# Patient Record
Sex: FEMALE | Race: BLACK OR AFRICAN AMERICAN | Employment: UNEMPLOYED | URBAN - METROPOLITAN AREA
[De-identification: names, ages, dates, MRNs, and addresses within clinical notes are randomized per-mention and may not be internally consistent; named-entity substitution may affect disease eponyms.]

---

## 2018-06-21 ENCOUNTER — HOSPITAL ENCOUNTER (EMERGENCY)
Facility: CLINIC | Age: 48
Discharge: HOME | End: 2018-06-21
Attending: EMERGENCY MEDICINE | Admitting: EMERGENCY MEDICINE
Payer: COMMERCIAL

## 2018-06-21 VITALS
DIASTOLIC BLOOD PRESSURE: 85 MMHG | SYSTOLIC BLOOD PRESSURE: 139 MMHG | HEIGHT: 64 IN | TEMPERATURE: 100.1 F | WEIGHT: 214.8 LBS | BODY MASS INDEX: 36.67 KG/M2 | RESPIRATION RATE: 16 BRPM | OXYGEN SATURATION: 100 % | HEART RATE: 70 BPM

## 2018-06-21 DIAGNOSIS — J06.9 VIRAL URI: ICD-10-CM

## 2018-06-21 DIAGNOSIS — M54.2 NECK PAIN: ICD-10-CM

## 2018-06-21 PROCEDURE — 99282 EMERGENCY DEPT VISIT SF MDM: CPT

## 2018-06-21 RX ORDER — METHOCARBAMOL 750 MG/1
750-1500 TABLET, FILM COATED ORAL 3 TIMES DAILY PRN
Qty: 30 TABLET | Refills: 0 | Status: SHIPPED | OUTPATIENT
Start: 2018-06-21 | End: 2018-06-26

## 2018-06-21 NOTE — ED AVS SNAPSHOT
Emergency Department    [address] [facility] [address]    Phone:  [phone]    Fax:  [phone]                                       Neema Kaur   MRN: [id number]    Department:   Emergency Department   Date of Visit:  6/21/2018           After Visit Summary Signature Page     I have received my discharge instructions, and my questions have been answered. I have discussed any challenges I see with this plan with the nurse or doctor.    ..........................................................................................................................................  Patient/Patient Representative Signature      ..........................................................................................................................................  Patient Representative Print Name and Relationship to Patient    ..................................................               ................................................  Date                                            Time    ..........................................................................................................................................  Reviewed by Signature/Title    ...................................................              ..............................................  Date                                                            Time

## 2018-06-21 NOTE — ED PROVIDER NOTES
"  History     Chief Complaint:  Neck pain    HPI   Neema Kaur is a 47 year old female who presents due to neck pain. She reportedly woke up with watery eyes, runny nose, sore throat, and sinus congestion 3 days ago. She also had bilateral neck pain, with difficulty moving from side to side. She saw a massage therapist today, and she noted that this made the pain worse. The patient denies having a fever or cough. She has tried taking Tylenol and Ibuprofen with minimal relief to her symptoms.    Allergies:  No Known Drug Allergies     Medications:    Synthroid    Past Medical History:    Arthritis  Hypertension  Thyroid disease    Past Surgical History:    History reviewed. No pertinent past surgical history.    Family History:    The patient denies any relevant family medical history.    Social History:  The patient was accompanied to the ED by friend.   Marital Status: Single.   No alcohol or other drug use.   No smoking history.      Review of Systems   HENT: Positive for rhinorrhea, sinus pressure and sore throat.    Musculoskeletal: Positive for myalgias and neck pain.   All other systems reviewed and are negative.    Physical Exam   Vitals:  Patient Vitals for the past 24 hrs:   BP Temp Temp src Heart Rate Resp SpO2 Height Weight   06/21/18 1820 (!) 192/92 100.1  F (37.8  C) Oral 83 16 99 % 1.626 m (5' 4\") 97.4 kg (214 lb 12.8 oz)     Physical Exam  General: Alert and interactive. Appears well. Cooperative and pleasant.   Eyes: The pupils are equal and round. EOMs intact. No scleral icterus.  ENT: No abnormalities to the external nose or ears. Mucous membranes moist. Posterior oropharynx is non-erythematous. Some tenderness with palpation of the sinuses bilaterally.    Neck: Trachea is in the midline. No nuchal rigidity. Minimal range of motion due to pain.    CV: Regular rate and rhythm. S1 and S2 normal without murmur, click, gallop or rub.   Resp: Breath sounds are clear bilaterally, without rhonchi, " wheezes, rales. Non-labored, no retractions or accessory muscle use.     GI: Abdomen is soft without distension. No tenderness to palpation. No peritoneal signs.    MS: Moving all extremities well. Good muscle tone.   Skin: Warm and dry. No rash or lesions noted.  Neuro: Alert and oriented x 3. No focal neurologic deficits. Good strength and sensation in upper and lower extremities.    Psych: Awake. Alert.  Normal affect. Appropriate interactions.  Lymph: Tenderness in neck but no anterior or posterior cervical lymphadenopathy noted.    Emergency Department Course   Emergency Department Course:  Nursing notes and vitals reviewed.  I performed an exam of the patient as documented above.   I discussed the treatment plan with the patient. They expressed understanding of this plan and consented to discharge. They will be discharged home with instructions for care and follow up. In addition, the patient will return to the emergency department with any new or worsening symptoms.  All questions were answered.    Impression & Plan    Medical Decision Making: Neema Kaur is a 47 year old female who presents for evaluation of nasal congestion, and a sore throat. This is consistent with a viral upper respiratory tract infection. There are no signs at this point of bacterial infection such as otitis media, retropharyngeal abscess, epiglottitis, peritonsillar abscess, strep pharyngitis, pneumonia, meningitis, or other more serious signs of bacteremia. The patient is well hydrated and otherwise well-appearing. Vitals are stable. Given clear lungs, no fever, no hypoxia and no respiratory distress, I do not feel that the patient needs a CXR at this point as the probability of bacterial pneumonia is very unlikely. Given only three days of symptoms, bacterial sinusitis is unlikely and I will not treat her with antibiotics.     Additionally, the patient complained of neck pain, and has some stiffness on exam. I do think this  is musculoskeletal in nature, as it resulted after sleeping poorly on a pillow one night. No trauma. No meningismus to suggest meningitis. I have given the patient a prescription for Robaxin to use for muscle relaxant. Close followup with primary care physician is recommended and precautions are given to return to ED for fever > 103, respiratory distress, protracted vomiting, confusion, or any other worsening symptoms. She has no further questions.     Diagnosis:    ICD-10-CM    1. Viral URI J06.9     B97.89    2. Neck pain M54.2      Disposition:   Discharged to home.     Discharge Medications:  New Prescriptions    METHOCARBAMOL (ROBAXIN) 750 MG TABLET    Take 1-2 tablets (750-1,500 mg) by mouth 3 times daily as needed for muscle spasms     IBeba PA-C, interviewed the patient, explained the course of action and discussed the patient with Dr. Baker, who then evaluated the patient.    Scribe Disclosure:  I, Camilo Higginbotham, am serving as a scribe at 6:56 PM on 6/21/2018 to document services personally performed by Beba Ballard PA-C, based on my observations and the provider's statements to me.  6/21/2018    EMERGENCY DEPARTMENT       Beba Ballard PA-C  06/22/18 0005

## 2018-06-21 NOTE — ED AVS SNAPSHOT
Emergency Department    [address] [facility] [address]    Phone:  [phone]    Fax:  [phone]                                       Neema Kaur   MRN: [id number]    Department:   Emergency Department   Date of Visit:  6/21/2018           Patient Information     Date Of Birth          1970        Your diagnoses for this visit were:     Viral URI     Neck pain        You were seen by Petros Baker MD.      Follow-up Information     Follow up with Kalpana Zapata MD In 1 week.    Specialty:  Family Practice    Contact information:    [facility]  [address]  [facility] [place] [address] [phone]          Follow up with  Emergency Department.    Specialty:  EMERGENCY MEDICINE    Why:  If symptoms worsen    Contact information:    [address] [facility] [address] [phone]        Discharge Instructions       Discharge Instructions  Upper Respiratory Infection    The upper respiratory tract includes the sinuses, nasal passages, pharynx, and larynx. A URI, or upper respiratory infection, is an infection of any of the parts of the upper airway. Symptoms include runny nose, congestion, sneezing, sore throat, cough, and fever. URIs are almost always caused by a virus. Antibiotics do not help with viral infections, so are generally not prescribed. A URI is very contagious through coughing and nasal secretions; make sure you wash your hands often and clean surfaces after sneezing, coughing or touching them. While you should start to improve in 3 - 5 days, remember that sometimes a cough can linger for several weeks.    Generally, every Emergency Department visit should have a follow-up clinic visit with either a primary or a specialty clinic/provider. Please follow-up as instructed by your emergency provider today.    Return to the Emergency Department if:    Any of your symptoms get much worse.    You seem very sick, like being too weak to get  up.    You have chest pain or shortness of breath.     You have a severe headache.    You are vomiting (throwing up) so much you cannot keep fluids or medicines down.    You have confusion or seem unusually drowsy.    You have a seizure.    What can I do to help myself?    Fill any prescriptions the provider gave you and take them right away    If you have a fever, get plenty of rest and drink lots of fluids, especially water.    Using a humidifier or saline nose spray will also help loosen mucous.     Clothes or blankets will not change your fever. Do what is comfortable for you.    Bathing or sponging in lukewarm water may help you feel better.    Acetaminophen (Tylenol ) or ibuprofen (Advil , Motrin ) will help bring fever down and may help you feel more comfortable. Be sure to read and follow the package directions, and ask your provider if you have questions.    Do not drink alcohol.    Decongestants may help you feel better. You may use decongestant nose sprays Afrin  (oxymetazoline) or Nikita-Synephrine  (phenylephrine hydrochloride) for up to 3 days, or may use a decongestant tablet like Sudafed  (pseudoephedrine).  If you were given a prescription for medicine here today, be sure to read all of the information (including the package insert) that comes with your prescription.  This will include important information about the medicine, its side effects, and any warnings that you need to know about.  The pharmacist who fills the prescription can provide more information and answer questions you may have about the medicine.  If you have questions or concerns that the pharmacist cannot address, please call or return to the Emergency Department.   Remember that you can always come back to the Emergency Department if you are not able to see your regular provider in the amount of time listed above, if you get any new symptoms, or if there is anything that worries you.  Discharge Instructions  Neck Strain    You have  been seen today for a neck sprain or strain.  Neck strains usually result from an injury to the neck. Car accidents, contact sports, and falls are common causes of neck strain. Sometimes your neck can start to hurt because of increased activity, muscle tension, an abnormal sleeping position, or because of other problems like arthritis in the neck.     Neck pain usually comes from injured muscles and ligaments. Sometimes there is a herniated ( slipped ) disc. We do not usually do MRI scans to look for these right away, since most herniated discs will get better on their own with time. Today, we did not find any evidence that your neck pain was caused by a serious or dangerous condition. However, sometimes symptoms develop over time and cannot be found during an emergency visit, so it is very important that you follow up with your primary provider.    Generally, every Emergency Department visit should have a follow-up clinic visit with either a primary or a specialty clinic/provider. Please follow-up as instructed by your emergency provider today.    Return to the Emergency Department if:    You have increasing pain in your neck.    You develop difficulty swallowing or breathing.    You have numbness, weakness, or trouble moving your arms or legs.    You have severe dizziness and difficulty walking.    You are unable to control your bladder or bowels.    You develop severe headache or ringing in the ears.    What can I do to help myself at home?    If you had an injury, use cold for the first 1-2 days. Cold helps relieve pain and reduce inflammation.  Apply ice packs to the neck or areas of pain every 1-2 hours for 20 minutes at a time. Place a towel or cloth between your skin and the ice pack.    After the first 2 days, using heat can help with neck pain and stiffness. You may use a warm shower or bath, warm towels on the neck, or a heating pad. Do not sleep with a heating pad, as you can be burned.     Pain  medications - You may take a pain medication such as Tylenol  (acetaminophen), Advil  and Motrin  (ibuprofen), or Aleve  (naproxen).    It is usually best to rest the neck for 1-2 days after an injury, then start gentle stretching exercises.     It is helpful to place a small pillow under the nape of your neck to provide proper neutral positioning.     You should stay active and do your usual work as much as you can, unless this involves heavy physical labor. Ask your provider if you need work restrictions.  If you were given a prescription for medicine here today, be sure to read all of the information (including the package insert) that comes with your prescription.  This will include important information about the medicine, its side effects, and any warnings that you need to know about.  The pharmacist who fills the prescription can provide more information and answer questions you may have about the medicine.  If you have questions or concerns that the pharmacist cannot address, please call or return to the Emergency Department.   Remember that you can always come back to the Emergency Department if you are not able to see your regular provider in the amount of time listed above, if you get any new symptoms, or if there is anything that worries you.      24 Hour Appointment Hotline       To make an appointment at any [facility], call [phone] ([phone]). If you don't have a family doctor or clinic, we will help you find one. [facility] are conveniently located to serve the needs of you and your family.             Review of your medicines      START taking        Dose / Directions Last dose taken    methocarbamol 750 MG tablet   Commonly known as:  ROBAXIN   Dose:  750-1500 mg   Quantity:  30 tablet        Take 1-2 tablets (750-1,500 mg) by mouth 3 times daily as needed for muscle spasms   Refills:  0          Our records show that you are taking the medicines listed below. If these are  incorrect, please call your family doctor or clinic.        Dose / Directions Last dose taken    SYNTHROID PO        Refills:  0                Prescriptions were sent or printed at these locations (1 Prescription)                   Other Prescriptions                Printed at Department/Unit printer (1 of 1)         methocarbamol (ROBAXIN) 750 MG tablet                Orders Needing Specimen Collection     None      Pending Results     No orders found from 6/19/2018 to 6/22/2018.            Pending Culture Results     No orders found from 6/19/2018 to 6/22/2018.            Pending Results Instructions     If you had any lab results that were not finalized at the time of your Discharge, you can call the ED Lab Result RN at [phone]. You will be contacted by this team for any positive Lab results or changes in treatment. The nurses are available 7 days a week from 10A to 6:30P.  You can leave a message 24 hours per day and they will return your call.        Test Results From Your Hospital Stay               Clinical Quality Measure: Blood Pressure Screening     Your blood pressure was checked while you were in the emergency department today. The last reading we obtained was  BP: (!) 192/92 . Please read the guidelines below about what these numbers mean and what you should do about them.  If your systolic blood pressure (the top number) is less than 120 and your diastolic blood pressure (the bottom number) is less than 80, then your blood pressure is normal. There is nothing more that you need to do about it.  If your systolic blood pressure (the top number) is 120-139 or your diastolic blood pressure (the bottom number) is 80-89, your blood pressure may be higher than it should be. You should have your blood pressure rechecked within a year by a primary care provider.  If your systolic blood pressure (the top number) is 140 or greater or your diastolic blood pressure (the bottom number) is 90 or greater, you  "may have high blood pressure. High blood pressure is treatable, but if left untreated over time it can put you at risk for heart attack, stroke, or kidney failure. You should have your blood pressure rechecked by a primary care provider within the next 4 weeks.  If your provider in the emergency department today gave you specific instructions to follow-up with your doctor or provider even sooner than that, you should follow that instruction and not wait for up to 4 weeks for your follow-up visit.        Thank you for choosing [facility]       Thank you for choosing [facility] for your care. Our goal is always to provide you with excellent care. Hearing back from our patients is one way we can continue to improve our services. Please take a few minutes to complete the written survey that you may receive in the mail after you visit with us. Thank you!        [facility] [employer] Information     [employer] lets you send messages to your doctor, view your test results, renew your prescriptions, schedule appointments and more. To sign up, go to [email or web address] . Click on \"Log in\" on the left side of the screen, which will take you to the Welcome page. Then click on \"Sign up Now\" on the right side of the page.     You will be asked to enter the access code listed below, as well as some personal information. Please follow the directions to create your username and password.     Your access code is: [email or web address]  Expires: 2018  7:37 PM     Your access code will  in 90 days. If you need help or a new code, please call your [facility] clinic or [phone].        Care EveryWhere ID     This is your Care EveryWhere ID. This could be used by other organizations to access your [facility] medical records  [id number]        Equal Access to Services     CHICO GARVIN : alba Cid qaybta kaalmada adeegyada, waxay idiin hayaan adeeg kharash la'aan ah. [facility] [phone].    ATENCIÓN: Si habla " español, tiene a lin disposición servicios gratuitos de asistencia lingüística. [place] al [phone].    We comply with applicable federal civil rights laws and [place] laws. We do not discriminate on the basis of race, color, national origin, age, disability, sex, sexual orientation, or gender identity.            After Visit Summary       This is your record. Keep this with you and show to your community pharmacist(s) and doctor(s) at your next visit.

## 2018-06-22 ASSESSMENT — ENCOUNTER SYMPTOMS
NECK PAIN: 1
MYALGIAS: 1
RHINORRHEA: 1
SINUS PRESSURE: 1
SORE THROAT: 1

## 2018-06-22 NOTE — ED NOTES
Emergency Department Attending Supervision Note  6/21/2018  7:30 PM      I evaluated this patient in conjunction with Beba Ballard PA-C      This is a 47 year old female who presents with 3 days of neck soreness and now has a cold, a mild sore throat, and some facial pain. This neck pain began after she woke up. She has tried Advil, Tylenol, and has had a massage with nothing seeming to help. She seems to be able to turn to the right okay, but not to the left. She is unaware of any fever, but her temperature is very low grade here at approximately 100.       On my exam, there was no pain directly over the posterior neck. There is some tenderness in the left sternocleidal muscle and the left paraspinal muscles. Her face is non tender, oropharynx is clear, and there is some mild submandibular gland swelling but no adenopathy. Neurologically she is fully intact. I do not think this represents meningitis or acute sub arachnoid hemorrhage. The cold symptoms do likely represent a cold and not a bacterial sinusitis. Patient will be treated conservatively with muscle relaxers, Ibuprofen, and cold compresses. Follow up with her PMD>    Impression:  1.wry neck  2. upper respiratory infection.    Pertos Baker MD Steinman, Randall Ira, MD  06/22/18 0030

## 2018-06-22 NOTE — DISCHARGE INSTRUCTIONS
Discharge Instructions  Upper Respiratory Infection    The upper respiratory tract includes the sinuses, nasal passages, pharynx, and larynx. A URI, or upper respiratory infection, is an infection of any of the parts of the upper airway. Symptoms include runny nose, congestion, sneezing, sore throat, cough, and fever. URIs are almost always caused by a virus. Antibiotics do not help with viral infections, so are generally not prescribed. A URI is very contagious through coughing and nasal secretions; make sure you wash your hands often and clean surfaces after sneezing, coughing or touching them. While you should start to improve in 3 - 5 days, remember that sometimes a cough can linger for several weeks.    Generally, every Emergency Department visit should have a follow-up clinic visit with either a primary or a specialty clinic/provider. Please follow-up as instructed by your emergency provider today.    Return to the Emergency Department if:    Any of your symptoms get much worse.    You seem very sick, like being too weak to get up.    You have chest pain or shortness of breath.     You have a severe headache.    You are vomiting (throwing up) so much you cannot keep fluids or medicines down.    You have confusion or seem unusually drowsy.    You have a seizure.    What can I do to help myself?    Fill any prescriptions the provider gave you and take them right away    If you have a fever, get plenty of rest and drink lots of fluids, especially water.    Using a humidifier or saline nose spray will also help loosen mucous.     Clothes or blankets will not change your fever. Do what is comfortable for you.    Bathing or sponging in lukewarm water may help you feel better.    Acetaminophen (Tylenol ) or ibuprofen (Advil , Motrin ) will help bring fever down and may help you feel more comfortable. Be sure to read and follow the package directions, and ask your provider if you have questions.    Do not drink  alcohol.    Decongestants may help you feel better. You may use decongestant nose sprays Afrin  (oxymetazoline) or Nikita-Synephrine  (phenylephrine hydrochloride) for up to 3 days, or may use a decongestant tablet like Sudafed  (pseudoephedrine).  If you were given a prescription for medicine here today, be sure to read all of the information (including the package insert) that comes with your prescription.  This will include important information about the medicine, its side effects, and any warnings that you need to know about.  The pharmacist who fills the prescription can provide more information and answer questions you may have about the medicine.  If you have questions or concerns that the pharmacist cannot address, please call or return to the Emergency Department.   Remember that you can always come back to the Emergency Department if you are not able to see your regular provider in the amount of time listed above, if you get any new symptoms, or if there is anything that worries you.  Discharge Instructions  Neck Strain    You have been seen today for a neck sprain or strain.  Neck strains usually result from an injury to the neck. Car accidents, contact sports, and falls are common causes of neck strain. Sometimes your neck can start to hurt because of increased activity, muscle tension, an abnormal sleeping position, or because of other problems like arthritis in the neck.     Neck pain usually comes from injured muscles and ligaments. Sometimes there is a herniated ( slipped ) disc. We do not usually do MRI scans to look for these right away, since most herniated discs will get better on their own with time. Today, we did not find any evidence that your neck pain was caused by a serious or dangerous condition. However, sometimes symptoms develop over time and cannot be found during an emergency visit, so it is very important that you follow up with your primary provider.    Generally, every Emergency  Department visit should have a follow-up clinic visit with either a primary or a specialty clinic/provider. Please follow-up as instructed by your emergency provider today.    Return to the Emergency Department if:    You have increasing pain in your neck.    You develop difficulty swallowing or breathing.    You have numbness, weakness, or trouble moving your arms or legs.    You have severe dizziness and difficulty walking.    You are unable to control your bladder or bowels.    You develop severe headache or ringing in the ears.    What can I do to help myself at home?    If you had an injury, use cold for the first 1-2 days. Cold helps relieve pain and reduce inflammation.  Apply ice packs to the neck or areas of pain every 1-2 hours for 20 minutes at a time. Place a towel or cloth between your skin and the ice pack.    After the first 2 days, using heat can help with neck pain and stiffness. You may use a warm shower or bath, warm towels on the neck, or a heating pad. Do not sleep with a heating pad, as you can be burned.     Pain medications - You may take a pain medication such as Tylenol  (acetaminophen), Advil  and Motrin  (ibuprofen), or Aleve  (naproxen).    It is usually best to rest the neck for 1-2 days after an injury, then start gentle stretching exercises.     It is helpful to place a small pillow under the nape of your neck to provide proper neutral positioning.     You should stay active and do your usual work as much as you can, unless this involves heavy physical labor. Ask your provider if you need work restrictions.  If you were given a prescription for medicine here today, be sure to read all of the information (including the package insert) that comes with your prescription.  This will include important information about the medicine, its side effects, and any warnings that you need to know about.  The pharmacist who fills the prescription can provide more information and answer questions  you may have about the medicine.  If you have questions or concerns that the pharmacist cannot address, please call or return to the Emergency Department.   Remember that you can always come back to the Emergency Department if you are not able to see your regular provider in the amount of time listed above, if you get any new symptoms, or if there is anything that worries you.

## 2019-05-07 ENCOUNTER — OFFICE VISIT (OUTPATIENT)
Dept: INTERNAL MEDICINE | Facility: CLINIC | Age: 49
End: 2019-05-07
Payer: COMMERCIAL

## 2019-05-07 VITALS
OXYGEN SATURATION: 98 % | WEIGHT: 211 LBS | RESPIRATION RATE: 19 BRPM | BODY MASS INDEX: 36.02 KG/M2 | HEART RATE: 95 BPM | DIASTOLIC BLOOD PRESSURE: 60 MMHG | SYSTOLIC BLOOD PRESSURE: 110 MMHG | HEIGHT: 64 IN | TEMPERATURE: 98.1 F

## 2019-05-07 DIAGNOSIS — Z00.00 ROUTINE HISTORY AND PHYSICAL EXAMINATION OF ADULT: Primary | ICD-10-CM

## 2019-05-07 DIAGNOSIS — H53.9 VISION CHANGES: ICD-10-CM

## 2019-05-07 DIAGNOSIS — E66.01 MORBID OBESITY (H): ICD-10-CM

## 2019-05-07 DIAGNOSIS — E03.9 HYPOTHYROIDISM, UNSPECIFIED TYPE: ICD-10-CM

## 2019-05-07 DIAGNOSIS — I10 ESSENTIAL HYPERTENSION: ICD-10-CM

## 2019-05-07 LAB — HGB BLD-MCNC: 12.5 G/DL (ref 11.7–15.7)

## 2019-05-07 PROCEDURE — 36415 COLL VENOUS BLD VENIPUNCTURE: CPT | Performed by: INTERNAL MEDICINE

## 2019-05-07 PROCEDURE — 80061 LIPID PANEL: CPT | Performed by: INTERNAL MEDICINE

## 2019-05-07 PROCEDURE — 80053 COMPREHEN METABOLIC PANEL: CPT | Performed by: INTERNAL MEDICINE

## 2019-05-07 PROCEDURE — 99213 OFFICE O/P EST LOW 20 MIN: CPT | Mod: 25 | Performed by: INTERNAL MEDICINE

## 2019-05-07 PROCEDURE — 85018 HEMOGLOBIN: CPT | Performed by: INTERNAL MEDICINE

## 2019-05-07 PROCEDURE — 84443 ASSAY THYROID STIM HORMONE: CPT | Performed by: INTERNAL MEDICINE

## 2019-05-07 PROCEDURE — 99386 PREV VISIT NEW AGE 40-64: CPT | Performed by: INTERNAL MEDICINE

## 2019-05-07 RX ORDER — CHOLECALCIFEROL (VITAMIN D3) 50 MCG
1 TABLET ORAL DAILY
Refills: 3 | COMMUNITY
Start: 2018-08-29

## 2019-05-07 RX ORDER — HYDROCHLOROTHIAZIDE 25 MG/1
25 TABLET ORAL
Refills: 1 | COMMUNITY
Start: 2019-02-17 | End: 2019-05-07

## 2019-05-07 RX ORDER — HYDROCHLOROTHIAZIDE 25 MG/1
25 TABLET ORAL DAILY
Qty: 90 TABLET | Refills: 1 | Status: SHIPPED | OUTPATIENT
Start: 2019-05-07 | End: 2020-04-13

## 2019-05-07 RX ORDER — OMEPRAZOLE 40 MG/1
40 CAPSULE, DELAYED RELEASE ORAL
Refills: 2 | COMMUNITY
Start: 2018-12-26 | End: 2020-04-10

## 2019-05-07 SDOH — HEALTH STABILITY: MENTAL HEALTH: HOW OFTEN DO YOU HAVE A DRINK CONTAINING ALCOHOL?: NEVER

## 2019-05-07 ASSESSMENT — ENCOUNTER SYMPTOMS
HEMATURIA: 0
CHILLS: 0
CONSTIPATION: 0
COUGH: 0
ABDOMINAL PAIN: 0
HEMATOCHEZIA: 0

## 2019-05-07 ASSESSMENT — MIFFLIN-ST. JEOR: SCORE: 1572.09

## 2019-05-07 NOTE — NURSING NOTE
"/60   Pulse 95   Temp 98.1  F (36.7  C) (Oral)   Resp 19   Ht 1.626 m (5' 4\")   Wt 95.7 kg (211 lb)   LMP 02/07/2019   SpO2 98%   BMI 36.22 kg/m    Patient in for annual female physical.  Sandrita Oneil CMA    "

## 2019-05-07 NOTE — PROGRESS NOTES
SUBJECTIVE:   CC: Neema Kaur is an 48 year old female who presents for preventative health visit.     Healthy Habits:     Getting at least 3 servings of Calcium per day:  Yes    Bi-annual eye exam:  NO    Dental care twice a year:  NO    Sleep apnea or symptoms of sleep apnea:  None    Diet:  Regular (no restrictions)    Frequency of exercise:  None    Taking medications regularly:  No    Barriers to taking medications:  None    Medication side effects:  Not applicable    PHQ-2 Total Score: 0    Additional concerns today:  No      Hypertension Follow-up      Outpatient blood pressures are not being checked.    Low Salt Diet: low salt    Hypothyroidism Follow-up      Since last visit, patient describes the following symptoms: Weight stable, no hair loss, no skin changes, no constipation, no loose stools    Patient is requesting referral to see an ophthalmologist c/o vision changes , blurry virion with near objects.    Patient  also has pessary for prolapse .      Today's PHQ-2 Score:   PHQ-2 ( 1999 Pf[employer]) 5/7/2019   Q1: Little interest or pleasure in doing things 0   Q2: Feeling down, depressed or hopeless 0   PHQ-2 Score 0   Q1: Little interest or pleasure in doing things Not at all   Q2: Feeling down, depressed or hopeless Not at all   PHQ-2 Score 0       Abuse: Current or Past(Physical, Sexual or Emotional)- No  Do you feel safe in your environment? Yes      Past Medical History:   Diagnosis Date     Arthritis      Hypertension      Thyroid disease        History reviewed. No pertinent surgical history.      Current Outpatient Medications   Medication Sig Dispense Refill     hydrochlorothiazide (HYDRODIURIL) 25 MG tablet Take 1 tablet (25 mg) by mouth daily 90 tablet 1     Levothyroxine Sodium (SYNTHROID PO) Take 50 mcg by mouth daily        omeprazole (PRILOSEC) 40 MG DR capsule 40 mg  2     vitamin D3 (CHOLECALCIFEROL) 2000 units tablet Take 1 tablet by mouth daily  3       History reviewed. No  "pertinent family history.         Social History     Tobacco Use     Smoking status: Never Smoker     Smokeless tobacco: Never Used   Substance Use Topics     Alcohol use: Never     Frequency: Never     If you drink alcohol do you typically have >3 drinks per day or >7 drinks per week? No    Alcohol Use 5/7/2019   Prescreen: >3 drinks/day or >7 drinks/week? No   No flowsheet data found.      Reviewed orders with patient. Reviewed health maintenance and updated orders accordingly - Yes      Reviewed and updated as needed this visit by clinical staff  Tobacco  Allergies  Meds  Med Hx  Surg Hx  Fam Hx  Soc Hx        Reviewed and updated as needed this visit by Provider            Review of Systems   Constitutional: Negative for chills.   HENT: Negative for congestion.    Respiratory: Negative for cough.    Cardiovascular: Negative for chest pain.   Gastrointestinal: Negative for abdominal pain, constipation and hematochezia.   Genitourinary: Negative for hematuria.     CONSTITUTIONAL: NEGATIVE for fever, chills, change in weight  INTEGUMENTARY/SKIN: NEGATIVE for worrisome rashes, moles or lesions  EYES: NEGATIVE for vision changes or irritation  ENT: NEGATIVE for ear, mouth and throat problems  RESP: NEGATIVE for significant cough or SOB  CV: NEGATIVE for chest pain, palpitations or peripheral edema  GI: NEGATIVE for nausea, abdominal pain, heartburn, or change in bowel habits   ; negative for frequency,urgency   MUSCULOSKELETAL: NEGATIVE for significant arthralgias or myalgia  NEURO: NEGATIVE for weakness, dizziness or paresthesias  PSYCHIATRIC: NEGATIVE for changes in mood or affect    OBJECTIVE:   /60   Pulse 95   Temp 98.1  F (36.7  C) (Oral)   Resp 19   Ht 1.626 m (5' 4\")   Wt 95.7 kg (211 lb)   LMP 02/07/2019   SpO2 98%   BMI 36.22 kg/m      Physical Exam  GENERAL: healthy, alert and no distress  EYES: Eyes grossly normal to inspection, PERRL and conjunctivae and sclerae normal  HENT: ear " canals and TM's normal, nose and mouth without ulcers or lesions  NECK: no adenopathy, no asymmetry, masses, or scars and thyroid normal to palpation  RESP: lungs clear to auscultation - no rales, rhonchi or wheezes  BREAST: normal without masses, tenderness or nipple discharge and no palpable axillary masses or adenopathy  CV: regular rate and rhythm, normal S1 S2, no S3 or S4, no murmur, click or rub, no peripheral edema and peripheral pulses strong  ABDOMEN: soft, nontender, no hepatosplenomegaly, no masses and bowel sounds normal   (female): normal female external genitalia, normal urethral meatus, pt has pessary , pap could not be done   MS: no gross musculoskeletal defects noted, no edema  NEURO: Normal strength and tone, mentation intact and speech normal  PSYCH: mentation appears normal, affect normal/bright      ASSESSMENT/PLAN:        (Z00.00) Routine history and physical examination of adult  (primary encounter diagnosis)  Plan: Hemoglobin, Comprehensive metabolic panel,         Lipid panel reflex to direct LDL Fasting, MA         Screening Digital Bilateral            (I10) Essential hypertension  Comment: BP stable  Plan: hydrochlorothiazide (HYDRODIURIL) 25 MG tablet refilled.explained clearly about the medication,insructions and side effects.Advised to follow low salt diet and exercise and f/u in 6 mths.      (E66.01) Morbid obesity (H)  Plan:  Discussed in detail about Diet,calorie intake,and importance of regular exercise,       (H53.9) Vision changes  Plan: OPHTHALMOLOGY ADULT REFERRAL            (E03.9) Hypothyroidism, unspecified type  Plan: on levoxyl 50 mcg daily , check TSH with free T4 reflex.pt was told I will contact her after results and proceed accordingly.            COUNSELING:   Reviewed preventive health counseling, as reflected in patient instructions       Regular exercise       Healthy diet/nutrition    BP Readings from Last 1 Encounters:   05/07/19 110/60     Estimated body mass  "index is 36.22 kg/m  as calculated from the following:    Height as of this encounter: 1.626 m (5' 4\").    Weight as of this encounter: 95.7 kg (211 lb).      Weight management plan: Discussed healthy diet and exercise guidelines     reports that she has never smoked. She has never used smokeless tobacco.      Counseling Resources:  ATP IV Guidelines  Pooled Cohorts Equation Calculator  FRAX Risk Assessment  ICSI Preventive Guidelines  Dietary Guidelines for Americans, 2010  USDA's MyPlate  ASA Prophylaxis  Lung CA Screening    Tyree Metcalf MD  [facility]  "

## 2019-05-07 NOTE — LETTER
May 10, 2019      Neema Verolulu Kaur  [address] [place] [address]        Dear ,    Your lab results came back within acceptable limits except for slightly high LDL cholesterol which is the bad cholesterol. Recommendations are to exercise regularly and follow a low fat low cholesterol diet. We will repeat these labs in 1 year.     Resulted Orders   Hemoglobin   Result Value Ref Range    Hemoglobin 12.5 11.7 - 15.7 g/dL   Comprehensive metabolic panel   Result Value Ref Range    Sodium 140 133 - 144 mmol/L    Potassium 4.0 3.4 - 5.3 mmol/L    Chloride 109 94 - 109 mmol/L    Carbon Dioxide 29 20 - 32 mmol/L    Anion Gap 2 (L) 3 - 14 mmol/L    Glucose 97 70 - 99 mg/dL      Comment:      Fasting specimen    Urea Nitrogen 12 7 - 30 mg/dL    Creatinine 0.52 0.52 - 1.04 mg/dL    GFR Estimate >90 >60 mL/min/[1.73_m2]      Comment:      Non  GFR Calc  Starting 12/18/2018, serum creatinine based estimated GFR (eGFR) will be   calculated using the Chronic Kidney Disease Epidemiology Collaboration   (CKD-EPI) equation.      GFR Estimate If Black >90 >60 mL/min/[1.73_m2]      Comment:       GFR Calc  Starting 12/18/2018, serum creatinine based estimated GFR (eGFR) will be   calculated using the Chronic Kidney Disease Epidemiology Collaboration   (CKD-EPI) equation.      Calcium 9.5 8.5 - 10.1 mg/dL    Bilirubin Total 0.4 0.2 - 1.3 mg/dL    Albumin 3.6 3.4 - 5.0 g/dL    Protein Total 7.8 6.8 - 8.8 g/dL    Alkaline Phosphatase 54 40 - 150 U/L    ALT 18 0 - 50 U/L    AST 14 0 - 45 U/L   Lipid panel reflex to direct LDL Fasting   Result Value Ref Range    Cholesterol 176 <200 mg/dL    Triglycerides 61 <150 mg/dL      Comment:      Fasting specimen    HDL Cholesterol 54 >49 mg/dL    LDL Cholesterol Calculated 110 (H) <100 mg/dL      Comment:      Above desirable:  100-129 mg/dl  Borderline High:  130-159 mg/dL  High:             160-189 mg/dL  Very high:       >189 mg/dl      Non  HDL Cholesterol 122 <130 mg/dL   TSH with free T4 reflex   Result Value Ref Range    TSH 0.94 0.40 - 4.00 mU/L       If you have any questions or concerns, please call the clinic at the number listed above.       Sincerely,        Tyree Metcalf MD

## 2019-05-08 LAB
ALBUMIN SERPL-MCNC: 3.6 G/DL (ref 3.4–5)
ALP SERPL-CCNC: 54 U/L (ref 40–150)
ALT SERPL W P-5'-P-CCNC: 18 U/L (ref 0–50)
ANION GAP SERPL CALCULATED.3IONS-SCNC: 2 MMOL/L (ref 3–14)
AST SERPL W P-5'-P-CCNC: 14 U/L (ref 0–45)
BILIRUB SERPL-MCNC: 0.4 MG/DL (ref 0.2–1.3)
BUN SERPL-MCNC: 12 MG/DL (ref 7–30)
CALCIUM SERPL-MCNC: 9.5 MG/DL (ref 8.5–10.1)
CHLORIDE SERPL-SCNC: 109 MMOL/L (ref 94–109)
CHOLEST SERPL-MCNC: 176 MG/DL
CO2 SERPL-SCNC: 29 MMOL/L (ref 20–32)
CREAT SERPL-MCNC: 0.52 MG/DL (ref 0.52–1.04)
GFR SERPL CREATININE-BSD FRML MDRD: >90 ML/MIN/{1.73_M2}
GLUCOSE SERPL-MCNC: 97 MG/DL (ref 70–99)
HDLC SERPL-MCNC: 54 MG/DL
LDLC SERPL CALC-MCNC: 110 MG/DL
NONHDLC SERPL-MCNC: 122 MG/DL
POTASSIUM SERPL-SCNC: 4 MMOL/L (ref 3.4–5.3)
PROT SERPL-MCNC: 7.8 G/DL (ref 6.8–8.8)
SODIUM SERPL-SCNC: 140 MMOL/L (ref 133–144)
TRIGL SERPL-MCNC: 61 MG/DL
TSH SERPL DL<=0.005 MIU/L-ACNC: 0.94 MU/L (ref 0.4–4)

## 2019-06-25 ENCOUNTER — OFFICE VISIT (OUTPATIENT)
Dept: INTERNAL MEDICINE | Facility: CLINIC | Age: 49
End: 2019-06-25
Payer: COMMERCIAL

## 2019-06-25 VITALS
HEART RATE: 89 BPM | DIASTOLIC BLOOD PRESSURE: 60 MMHG | OXYGEN SATURATION: 98 % | SYSTOLIC BLOOD PRESSURE: 126 MMHG | RESPIRATION RATE: 18 BRPM | TEMPERATURE: 97.5 F | WEIGHT: 207.6 LBS | HEIGHT: 64 IN | BODY MASS INDEX: 35.44 KG/M2

## 2019-06-25 DIAGNOSIS — N39.0 URINARY TRACT INFECTION WITH HEMATURIA, SITE UNSPECIFIED: ICD-10-CM

## 2019-06-25 DIAGNOSIS — R35.0 URINARY FREQUENCY: Primary | ICD-10-CM

## 2019-06-25 DIAGNOSIS — Z71.2 ENCOUNTER TO DISCUSS TEST RESULTS: ICD-10-CM

## 2019-06-25 DIAGNOSIS — R31.9 URINARY TRACT INFECTION WITH HEMATURIA, SITE UNSPECIFIED: ICD-10-CM

## 2019-06-25 LAB
ALBUMIN UR-MCNC: NEGATIVE MG/DL
APPEARANCE UR: ABNORMAL
BACTERIA #/AREA URNS HPF: ABNORMAL /HPF
BILIRUB UR QL STRIP: NEGATIVE
COLOR UR AUTO: YELLOW
GLUCOSE UR STRIP-MCNC: NEGATIVE MG/DL
HGB UR QL STRIP: ABNORMAL
KETONES UR STRIP-MCNC: NEGATIVE MG/DL
LEUKOCYTE ESTERASE UR QL STRIP: ABNORMAL
NITRATE UR QL: NEGATIVE
NON-SQ EPI CELLS #/AREA URNS LPF: ABNORMAL /LPF
PH UR STRIP: 5.5 PH (ref 5–7)
RBC #/AREA URNS AUTO: ABNORMAL /HPF
SOURCE: ABNORMAL
SP GR UR STRIP: <=1.005 (ref 1–1.03)
UROBILINOGEN UR STRIP-ACNC: 0.2 EU/DL (ref 0.2–1)
WBC #/AREA URNS AUTO: ABNORMAL /HPF

## 2019-06-25 PROCEDURE — 87086 URINE CULTURE/COLONY COUNT: CPT | Performed by: INTERNAL MEDICINE

## 2019-06-25 PROCEDURE — 81001 URINALYSIS AUTO W/SCOPE: CPT | Performed by: INTERNAL MEDICINE

## 2019-06-25 PROCEDURE — 99214 OFFICE O/P EST MOD 30 MIN: CPT | Performed by: INTERNAL MEDICINE

## 2019-06-25 RX ORDER — CIPROFLOXACIN 500 MG/1
500 TABLET, FILM COATED ORAL 2 TIMES DAILY
Qty: 10 TABLET | Refills: 0 | Status: SHIPPED | OUTPATIENT
Start: 2019-06-25 | End: 2020-04-13

## 2019-06-25 ASSESSMENT — MIFFLIN-ST. JEOR: SCORE: 1556.67

## 2019-06-25 NOTE — NURSING NOTE
"BP (!) 142/92   Pulse 89   Temp 97.5  F (36.4  C) (Oral)   Resp 18   Ht 1.626 m (5' 4\")   Wt 94.2 kg (207 lb 9.6 oz)   LMP 02/25/2019   SpO2 98%   BMI 35.63 kg/m    Patient in for follow up on recent labs.  Sandrita Oneil, CMA    "

## 2019-06-25 NOTE — PROGRESS NOTES
"Subjective     eNema Kaur is a 48 year old female who presents to clinic today for the following health issues:    HPI     Pt is a 48 year old female who is seen here to day to discuss recent test results.    URINARY TRACT SYMPTOMS      Duration: 1 wk    Description  frequency and urgency    Intensity:  moderate    Accompanying signs and symptoms:  Fever/chills: no   Flank pain no   Nausea and vomiting: no   Vaginal symptoms: none  Abdominal/Pelvic Pain: no     History  History of frequent UTI's: no   History of kidney stones: no   Sexually Active: no ,not since 3 yrs   Possibility of pregnancy: No    Precipitating or alleviating factors: None    Therapies tried and outcome: none          Patient Active Problem List   Diagnosis     Thyroid disease     Hypertension     Obesity (BMI 35.0-39.9) with comorbidity (H)     Hypothyroidism, unspecified type     History reviewed. No pertinent surgical history.    Social History     Tobacco Use     Smoking status: Never Smoker     Smokeless tobacco: Never Used   Substance Use Topics     Alcohol use: Never     Frequency: Never     History reviewed. No pertinent family history.      Current Outpatient Medications   Medication Sig Dispense Refill     hydrochlorothiazide (HYDRODIURIL) 25 MG tablet Take 1 tablet (25 mg) by mouth daily 90 tablet 1     Levothyroxine Sodium (SYNTHROID PO) Take 50 mcg by mouth daily        omeprazole (PRILOSEC) 40 MG DR capsule 40 mg  2     vitamin D3 (CHOLECALCIFEROL) 2000 units tablet Take 1 tablet by mouth daily  3         Reviewed and updated as needed this visit by Provider         Review of Systems   ROS COMP: CONSTITUTIONAL: NEGATIVE for fever, chills, change in weight  GI: NEGATIVE for nausea, abdominal pain, heartburn, or change in bowel habits  : frequency and urgency urination       Objective    /60   Pulse 89   Temp 97.5  F (36.4  C) (Oral)   Resp 18   Ht 1.626 m (5' 4\")   Wt 94.2 kg (207 lb 9.6 oz)   LMP 02/25/2019  "  SpO2 98%   BMI 35.63 kg/m    Body mass index is 35.63 kg/m .  Physical Exam   GENERAL: healthy, alert and no distress  RESP: lungs clear to auscultation - no rales, rhonchi or wheezes  CV: regular rate and rhythm,    ABDOMEN: soft, nontender,   bowel sounds normal  BACK: no CVA tenderness, no paralumbar tenderness          Assessment & Plan       (N39.0,  R31.9) Urinary tract infection with hematuria, site unspecified  Plan:UA with Microscopic reflex to Culture, Urine Culture Aerobic Bacterial,  Started on ciprofloxacin (CIPRO) 500 MG tablet bid as directed.explained clearly about the medication,insructions and side effects.   Call or return to clinic prn if these symtoms worsen, fail to improve as anticipated, or if new symptoms develop.           (R35.0) Urinary frequency   Plan: UA with Microscopic reflex to Culture            (Z71.2) Encounter to discuss test results  Plan: Discussed in detail about her lab results, slightly above normal LDL-110, advised to follow low-fat diet regular exercise.          Tyree Metcalf MD  [facility]

## 2019-06-26 LAB
BACTERIA SPEC CULT: NORMAL
SPECIMEN SOURCE: NORMAL

## 2020-01-20 ENCOUNTER — THERAPY VISIT (OUTPATIENT)
Dept: PHYSICAL THERAPY | Facility: CLINIC | Age: 50
End: 2020-01-20
Payer: COMMERCIAL

## 2020-01-20 DIAGNOSIS — M25.562 CHRONIC PAIN OF BOTH KNEES: ICD-10-CM

## 2020-01-20 DIAGNOSIS — G89.29 CHRONIC PAIN OF BOTH KNEES: ICD-10-CM

## 2020-01-20 DIAGNOSIS — M25.561 CHRONIC PAIN OF BOTH KNEES: ICD-10-CM

## 2020-01-20 PROCEDURE — 97161 PT EVAL LOW COMPLEX 20 MIN: CPT | Mod: GP | Performed by: PHYSICAL THERAPIST

## 2020-01-20 PROCEDURE — 97110 THERAPEUTIC EXERCISES: CPT | Mod: GP | Performed by: PHYSICAL THERAPIST

## 2020-01-20 NOTE — PROGRESS NOTES
[facility] Initial Evaluation  Subjective:  History of bilateral knee pain since 2005 secondary to a fall. Pt has had constant pain since.  Previous treatment has consisted of medication and injections to the knees. Pt referred by MD for physical therapy on1-13-20.    The history is provided by the patient. No  was used.   Type of problem:  Bilateral knees   Condition occurred with:  A fall/slip.    Problem details: Pt rates pain as a 7/10 in both knees.   Patient reports pain:  Anterior, lateral and medial. Radiates to:  Thigh, hip, lower leg and foot.  Symptoms are exacerbated by walking, standing, weight bearing, ascending stairs, descending stairs, transfers and bending/squatting and relieved by rest, ice, heat and NSAID's.                      Objective:    Gait:  Decreased pelvic stability                                                          Knee Evaluation:  ROM:  Strength wnl knee: weak pelvic stabilizers.  AROM    Hyperextension:  Left:  0    Right: 0  Extension:  Left: 0    Right:  0  Flexion: Left: 120    Right: 130  PROM    Hyperextension: Left: 0    Right:  0  Extension: Left: 0    Right:  0  Flexion: Left: 130    Right:  135      Strength:     Extension:  Left: 4-/5   Pain:      Right: 4-/5   Pain:  Flexion:  Left: 4/5   Pain:      Right: 4/5   Pain:            Palpation:    Left knee tenderness present at:  Medial Joint Line and Lateral Joint Line  Right knee tenderness present at:  Medial Joint Line and Lateral Joint Line            General     ROS    Assessment/Plan:    Patient is a 49 year old female with both sides knee complaints.    Patient has the following significant findings with corresponding treatment plan.                Diagnosis 1:  Bilateral knee pain / OA  Pain -  hot/cold therapy, self management, education and home program  Decreased ROM/flexibility - therapeutic exercise, therapeutic activity and home program  Decreased strength -  therapeutic exercise, therapeutic activities and home program    Therapy Evaluation Codes:   1) History comprised of:   Personal factors that impact the plan of care:      Language.    Comorbidity factors that impact the plan of care are:      High blood pressure, Weakness and thyroid.     Medications impacting care: Pain and thyroid.  2) Examination of Body Systems comprised of:   Body structures and functions that impact the plan of care:      Knee.   Activity limitations that impact the plan of care are:      Squatting/kneeling, Stairs, Standing and Walking.  3) Clinical presentation characteristics are:   Stable/Uncomplicated.  4) Decision-Making    Low complexity using standardized patient assessment instrument and/or measureable assessment of functional outcome.  Cumulative Therapy Evaluation is: Low complexity.    Previous and current functional limitations:  (See Goal Flow Sheet for this information)    Short term and Long term goals: (See Goal Flow Sheet for this information)     Communication ability:  Patient appears to be able to clearly communicate and understand verbal and written communication and follow directions correctly.  Treatment Explanation - The following has been discussed with the patient:   RX ordered/plan of care  Anticipated outcomes  Possible risks and side effects  This patient would benefit from PT intervention to resume normal activities.   Rehab potential is good.    Frequency:  2 X week, once daily  Duration:  for 6 weeks  Discharge Plan:  Achieve all LTG.  Independent in home treatment program.  Reach maximal therapeutic benefit.    Please refer to the daily flowsheet for treatment today, total treatment time and time spent performing 1:1 timed codes.

## 2020-01-20 NOTE — LETTER
[facility]  [place]  [address] [facility]  [address]  [facility] [address]  [phone]    2020    Re: Neema Kaur   :   1970  MRN:  [id number]   REFERRING PHYSICIAN:   Mack BENNETT LIA PT    Date of Initial Evaluation:  2020  Visits:  Rxs Used: 1  Reason for Referral:  Chronic pain of both knees    [facility] Initial Evaluation  Subjective:  History of bilateral knee pain since  secondary to a fall. Pt has had constant pain since.  Previous treatment has consisted of medication and injections to the knees. Pt referred by MD for physical therapy on20.    The history is provided by the patient. No  was used.   Type of problem:  Bilateral knees   Condition occurred with:  A fall/slip.    Problem details: Pt rates pain as a 7/10 in both knees.   Patient reports pain:  Anterior, lateral and medial. Radiates to:  Thigh, hip, lower leg and foot.  Symptoms are exacerbated by walking, standing, weight bearing, ascending stairs, descending stairs, transfers and bending/squatting and relieved by rest, ice, heat and NSAID's.              Objective:    Gait:  Decreased pelvic stability     Knee Evaluation:  ROM:  Strength wnl knee: weak pelvic stabilizers.  AROM    Hyperextension:  Left:  0    Right: 0  Extension:  Left: 0    Right:  0  Flexion: Left: 120    Right: 130  PROM    Hyperextension: Left: 0    Right:  0  Extension: Left: 0    Right:  0  Flexion: Left: 130    Right:  135    Strength:     Extension:  Left: 4-/5   Pain:      Right: 4-/5   Pain:  Flexion:  Left: 4/5   Pain:      Right: 4/5   Pain:      Palpation:    Left knee tenderness present at:  Medial Joint Line and Lateral Joint Line  Right knee tenderness present at:  Medial Joint Line and Lateral Joint Line      Re: Neema Kaur   :   1970  MRN:  [id number]     Assessment/Plan:    Patient is a 49 year old female with both sides knee complaints.     Patient has the following significant findings with corresponding treatment plan.                Diagnosis 1:  Bilateral knee pain / OA  Pain -  hot/cold therapy, self management, education and home program  Decreased ROM/flexibility - therapeutic exercise, therapeutic activity and home program  Decreased strength - therapeutic exercise, therapeutic activities and home program    Therapy Evaluation Codes:   1) History comprised of:   Personal factors that impact the plan of care:      Language.    Comorbidity factors that impact the plan of care are:      High blood pressure, Weakness and thyroid.     Medications impacting care: Pain and thyroid.  2) Examination of Body Systems comprised of:   Body structures and functions that impact the plan of care:      Knee.   Activity limitations that impact the plan of care are:      Squatting/kneeling, Stairs, Standing and Walking.  3) Clinical presentation characteristics are:   Stable/Uncomplicated.  4) Decision-Making    Low complexity using standardized patient assessment instrument and/or measureable assessment of functional outcome.  Cumulative Therapy Evaluation is: Low complexity.    Previous and current functional limitations:  (See Goal Flow Sheet for this information)    Short term and Long term goals: (See Goal Flow Sheet for this information)     Communication ability:  Patient appears to be able to clearly communicate and understand verbal and written communication and follow directions correctly.  Treatment Explanation - The following has been discussed with the patient:   RX ordered/plan of care  Anticipated outcomes  Possible risks and side effects  This patient would benefit from PT intervention to resume normal activities.   Rehab potential is good.    Frequency:  2 X week, once daily  Duration:  for 6 weeks  Discharge Plan:  Achieve all LTG.  Independent in home treatment program.  Reach maximal therapeutic benefit.      Thank you for your  referral.    INQUIRIES  Therapist: Navid Castillo, PT   [facility] [place] [facility]  [address] [facility],[address] [address]  Phone: [phone]  Fax: [phone]

## 2020-01-21 PROBLEM — M25.561 CHRONIC PAIN OF BOTH KNEES: Status: ACTIVE | Noted: 2020-01-21

## 2020-01-21 PROBLEM — G89.29 CHRONIC PAIN OF BOTH KNEES: Status: ACTIVE | Noted: 2020-01-21

## 2020-01-21 PROBLEM — M25.562 CHRONIC PAIN OF BOTH KNEES: Status: ACTIVE | Noted: 2020-01-21

## 2020-01-27 ENCOUNTER — THERAPY VISIT (OUTPATIENT)
Dept: PHYSICAL THERAPY | Facility: CLINIC | Age: 50
End: 2020-01-27
Payer: COMMERCIAL

## 2020-01-27 DIAGNOSIS — G89.29 CHRONIC PAIN OF BOTH KNEES: ICD-10-CM

## 2020-01-27 DIAGNOSIS — M25.562 CHRONIC PAIN OF BOTH KNEES: ICD-10-CM

## 2020-01-27 DIAGNOSIS — M25.561 CHRONIC PAIN OF BOTH KNEES: ICD-10-CM

## 2020-01-27 PROCEDURE — 97110 THERAPEUTIC EXERCISES: CPT | Mod: GP | Performed by: PHYSICAL THERAPIST

## 2020-01-27 PROCEDURE — 97530 THERAPEUTIC ACTIVITIES: CPT | Mod: GP | Performed by: PHYSICAL THERAPIST

## 2020-01-29 ENCOUNTER — THERAPY VISIT (OUTPATIENT)
Dept: PHYSICAL THERAPY | Facility: CLINIC | Age: 50
End: 2020-01-29
Payer: COMMERCIAL

## 2020-01-29 DIAGNOSIS — M25.562 CHRONIC PAIN OF BOTH KNEES: ICD-10-CM

## 2020-01-29 DIAGNOSIS — G89.29 CHRONIC PAIN OF BOTH KNEES: ICD-10-CM

## 2020-01-29 DIAGNOSIS — M25.561 CHRONIC PAIN OF BOTH KNEES: ICD-10-CM

## 2020-01-29 PROCEDURE — 97530 THERAPEUTIC ACTIVITIES: CPT | Mod: GP | Performed by: PHYSICAL THERAPIST

## 2020-01-29 PROCEDURE — 97110 THERAPEUTIC EXERCISES: CPT | Mod: GP | Performed by: PHYSICAL THERAPIST

## 2020-02-05 ENCOUNTER — TRANSFERRED RECORDS (OUTPATIENT)
Dept: HEALTH INFORMATION MANAGEMENT | Facility: CLINIC | Age: 50
End: 2020-02-05

## 2020-03-16 PROBLEM — M25.561 CHRONIC PAIN OF BOTH KNEES: Status: RESOLVED | Noted: 2020-01-21 | Resolved: 2020-03-16

## 2020-03-16 PROBLEM — G89.29 CHRONIC PAIN OF BOTH KNEES: Status: RESOLVED | Noted: 2020-01-21 | Resolved: 2020-03-16

## 2020-03-16 PROBLEM — M25.562 CHRONIC PAIN OF BOTH KNEES: Status: RESOLVED | Noted: 2020-01-21 | Resolved: 2020-03-16

## 2020-03-16 NOTE — PROGRESS NOTES
Subjective:  HPI  Physical Exam                    Objective:  System    Physical Exam    General     ROS    Assessment/Plan:    DISCHARGE REPORT    Progress reporting period is from 1/20/20 to 1/29/20.       SUBJECTIVE  Subjective changes noted by patient:  unknown.       Current pain level is NA  .     Previous pain level was  7/10 Initial Pain level: 7/10.   Changes in function:  unknown  Adverse reaction to treatment or activity: None    OBJECTIVE  Changes noted in objective findings:  Objective changes unknown        ASSESSMENT/PLAN  Updated problem list and treatment plan: Diagnosis 1:  Bilateral knee pain  Pain -  hot/cold therapy, self management, education and home program  Decreased ROM/flexibility - therapeutic exercise, therapeutic activity and home program  Decreased strength - therapeutic exercise, therapeutic activities and home program  Assessment of Progress: progress unknown  Self Management Plans:  Patient has been instructed in a home treatment program.  I have re-evaluated this patient and find that the nature, scope, duration and intensity of the therapy is appropriate for the medical condition of the patient.  Neema continues to require the following intervention to meet STG and LTG's:  PT    Recommendations:  Pt has not returned for treatment since 1-29-20. Pt discharged at this time.      Please refer to the daily flowsheet for treatment today, total treatment time and time spent performing 1:1 timed codes.

## 2020-04-10 ENCOUNTER — TELEPHONE (OUTPATIENT)
Dept: INTERNAL MEDICINE | Facility: CLINIC | Age: 50
End: 2020-04-10

## 2020-04-10 ENCOUNTER — NURSE TRIAGE (OUTPATIENT)
Dept: NURSING | Facility: CLINIC | Age: 50
End: 2020-04-10

## 2020-04-10 DIAGNOSIS — K21.00 GASTROESOPHAGEAL REFLUX DISEASE WITH ESOPHAGITIS: ICD-10-CM

## 2020-04-10 DIAGNOSIS — I10 ESSENTIAL HYPERTENSION: ICD-10-CM

## 2020-04-10 DIAGNOSIS — E07.9 THYROID DISEASE: Primary | ICD-10-CM

## 2020-04-10 DIAGNOSIS — K21.9 GASTROESOPHAGEAL REFLUX DISEASE, ESOPHAGITIS PRESENCE NOT SPECIFIED: ICD-10-CM

## 2020-04-10 NOTE — TELEPHONE ENCOUNTER
Patient is calling to request a rx for flonase and loratadine. She has gotten these in the past from her doctor in [place].

## 2020-04-10 NOTE — TELEPHONE ENCOUNTER
Needs to schedule a phone visit for Monday. No triage, no symptoms. FNA will transfer to scheduling.    Radha Paulson, RN  [facility] Nurse Advisor     Reason for Disposition    Requesting regular office appointment    Additional Information    Negative: [1] Caller is not with the adult (patient) AND [2] reporting urgent symptoms    Negative: Lab result questions    Negative: Medication questions    Negative: Caller can't be reached by phone    Negative: Caller has already spoken to PCP or another triager    Negative: RN needs further essential information from caller in order to complete triage    Protocols used: INFORMATION ONLY CALL-A-

## 2020-04-10 NOTE — TELEPHONE ENCOUNTER
"Requested Prescriptions   Pending Prescriptions Disp Refills     hydrochlorothiazide (HYDRODIURIL) 25 MG tablet  Last Written Prescription Date:  05/07/19  Last Fill Quantity: 90,  # refills: 1   Last office visit: No previous visit found with prescribing provider:  06/25/19   Future Office Visit:     90 tablet 1     Sig: Take 1 tablet (25 mg) by mouth daily       Diuretics (Including Combos) Protocol Passed - 4/10/2020 11:36 AM        Passed - Blood pressure under 140/90 in past 12 months     BP Readings from Last 3 Encounters:   06/25/19 126/60   05/07/19 110/60   06/21/18 139/85                 Passed - Recent (12 mo) or future (30 days) visit within the authorizing provider's specialty     Patient has had an office visit with the authorizing provider or a provider within the authorizing providers department within the previous 12 mos or has a future within next 30 days. See \"Patient Info\" tab in inbasket, or \"Choose Columns\" in Meds & Orders section of the refill encounter.              Passed - Medication is active on med list        Passed - Patient is age 18 or older        Passed - No active pregancy on record        Passed - Normal serum creatinine on file in past 12 months     Recent Labs   Lab Test 05/07/19  1501   CR 0.52              Passed - Normal serum potassium on file in past 12 months     Recent Labs   Lab Test 05/07/19  1501   POTASSIUM 4.0                    Passed - Normal serum sodium on file in past 12 months     Recent Labs   Lab Test 05/07/19  1501                 Passed - No positive pregnancy test in past 12 months           levothyroxine (SYNTHROID) 50 MCG tablet  Last Written Prescription Date:    Last Fill Quantity: ,  # refills:    Last office visit: No previous visit found with prescribing provider:  06/25/19   Future Office Visit:           Sig: Take 1 tablet (50 mcg) by mouth daily       Thyroid Protocol Passed - 4/10/2020 11:36 AM        Passed - Patient is 12 years or older " "       Passed - Recent (12 mo) or future (30 days) visit within the authorizing provider's specialty     Patient has had an office visit with the authorizing provider or a provider within the authorizing providers department within the previous 12 mos or has a future within next 30 days. See \"Patient Info\" tab in inbasket, or \"Choose Columns\" in Meds & Orders section of the refill encounter.              Passed - Medication is active on med list        Passed - Normal TSH on file in past 12 months     Recent Labs   Lab Test 19  1501   TSH 0.94              Passed - No active pregnancy on record     If patient is pregnant or has had a positive pregnancy test, please check TSH.          Passed - No positive pregnancy test in past 12 months     If patient is pregnant or has had a positive pregnancy test, please check TSH.             omeprazole (PRILOSEC) 40 MG DR capsule  Last Written Prescription Date:  18  Last Fill Quantity: ,  # refills: 2   Last office visit: No previous visit found with prescribing provider:  19   Future Office Visit:      2     Si capsule (40 mg)       PPI Protocol Passed - 4/10/2020 11:36 AM        Passed - Not on Clopidogrel (unless Pantoprazole ordered)        Passed - No diagnosis of osteoporosis on record        Passed - Recent (12 mo) or future (30 days) visit within the authorizing provider's specialty     Patient has had an office visit with the authorizing provider or a provider within the authorizing providers department within the previous 12 mos or has a future within next 30 days. See \"Patient Info\" tab in inbasket, or \"Choose Columns\" in Meds & Orders section of the refill encounter.              Passed - Medication is active on med list        Passed - Patient is age 18 or older        Passed - No active pregnacy on record        Passed - No positive pregnancy test in past 12 months             "

## 2020-04-10 NOTE — TELEPHONE ENCOUNTER
I have not seen this pt since 06/19. Please advise phone visit for Monday and I can refill at that time

## 2020-04-13 ENCOUNTER — VIRTUAL VISIT (OUTPATIENT)
Dept: INTERNAL MEDICINE | Facility: CLINIC | Age: 50
End: 2020-04-13
Payer: COMMERCIAL

## 2020-04-13 ENCOUNTER — NURSE TRIAGE (OUTPATIENT)
Dept: INTERNAL MEDICINE | Facility: CLINIC | Age: 50
End: 2020-04-13

## 2020-04-13 DIAGNOSIS — R52 BODY ACHES: ICD-10-CM

## 2020-04-13 DIAGNOSIS — J30.2 SEASONAL ALLERGIC RHINITIS, UNSPECIFIED TRIGGER: ICD-10-CM

## 2020-04-13 DIAGNOSIS — I10 ESSENTIAL HYPERTENSION: Primary | ICD-10-CM

## 2020-04-13 DIAGNOSIS — E03.9 HYPOTHYROIDISM, UNSPECIFIED TYPE: ICD-10-CM

## 2020-04-13 PROCEDURE — 99214 OFFICE O/P EST MOD 30 MIN: CPT | Mod: TEL | Performed by: INTERNAL MEDICINE

## 2020-04-13 RX ORDER — HYDROCHLOROTHIAZIDE 25 MG/1
25 TABLET ORAL DAILY
Qty: 90 TABLET | Refills: 0 | Status: SHIPPED | OUTPATIENT
Start: 2020-04-13 | End: 2020-07-21

## 2020-04-13 RX ORDER — OMEPRAZOLE 40 MG/1
40 CAPSULE, DELAYED RELEASE ORAL DAILY
Qty: 90 CAPSULE | Refills: 0 | Status: SHIPPED | OUTPATIENT
Start: 2020-04-13 | End: 2020-08-04

## 2020-04-13 RX ORDER — LEVOTHYROXINE SODIUM 50 UG/1
50 TABLET ORAL DAILY
Qty: 90 TABLET | Refills: 0 | Status: SHIPPED | OUTPATIENT
Start: 2020-04-13 | End: 2020-08-04

## 2020-04-13 RX ORDER — FLUTICASONE PROPIONATE 50 MCG
1 SPRAY, SUSPENSION (ML) NASAL DAILY
Qty: 9.9 ML | Refills: 0 | Status: SHIPPED | OUTPATIENT
Start: 2020-04-13 | End: 2020-05-28

## 2020-04-13 RX ORDER — LORATADINE 10 MG/1
10 TABLET ORAL DAILY
Qty: 30 TABLET | Refills: 1 | Status: SHIPPED | OUTPATIENT
Start: 2020-04-13 | End: 2020-07-21

## 2020-04-13 NOTE — PROGRESS NOTES
"Neema Kaur is a 49 year old female who is being evaluated via a billable telephone visit.      The patient has been notified of following:     \"This telephone visit will be conducted via a call between you and your physician/provider. We have found that certain health care needs can be provided without the need for a physical exam.  This service lets us provide the care you need with a short phone conversation.  If a prescription is necessary we can send it directly to your pharmacy.  If lab work is needed we can place an order for that and you can then stop by our lab to have the test done at a later time.    Telephone visits are billed at different rates depending on your insurance coverage. During this emergency period, for some insurers they may be billed the same as an in-person visit.  Please reach out to your insurance provider with any questions.    If during the course of the call the physician/provider feels a telephone visit is not appropriate, you will not be charged for this service.\"    Patient has given verbal consent for Telephone visit?  Yes    How would you like to obtain your AVS? Mail a copy    Subjective     Neema Kaur is a 49 year old female who presents for phone today for the following health issues:    Hypertension Follow-up      Do you check your blood pressure regularly outside of the clinic? Yes     Are you following a low salt diet? Yes    Are your blood pressures ever more than 140 on the top number (systolic) OR more   than 90 on the bottom number (diastolic), for example 140/90? No    Hypothyroidism Follow-up      Since last visit, patient describes the following symptoms: Weight stable, no hair loss, no skin changes, no constipation, no loose stools      Pt also c/o Runny nose, sneezing, felt warm/chills, sob since this am. No cough. No h/o exposure to corona virus.  Patient states that she lives alone and has not gone out and has been home since 2 weeks.    Pt also " has h/o allergies and was using Claritin and flonase nasal spray .and requesting refills         How many servings of fruits and vegetables do you eat daily?  4 or more    On average, how many sweetened beverages do you drink each day (Examples: soda, juice, sweet tea, etc.  Do NOT count diet or artificially sweetened beverages)?   0    How many days per week do you exercise enough to make your heart beat faster? 4    How many minutes a day do you exercise enough to make your heart beat faster? 20 - 29    How many days per week do you miss taking your medication? 0         Patient Active Problem List   Diagnosis     Thyroid disease     Hypertension     Obesity (BMI 35.0-39.9) with comorbidity (H)     Hypothyroidism, unspecified type     History reviewed. No pertinent surgical history.    Social History     Tobacco Use     Smoking status: Never Smoker     Smokeless tobacco: Never Used   Substance Use Topics     Alcohol use: Never     Frequency: Never     History reviewed. No pertinent family history.      Current Outpatient Medications   Medication Sig Dispense Refill     hydrochlorothiazide (HYDRODIURIL) 25 MG tablet Take 1 tablet (25 mg) by mouth daily 90 tablet 0     levothyroxine (SYNTHROID) 50 MCG tablet Take 1 tablet (50 mcg) by mouth daily 90 tablet 0     omeprazole (PRILOSEC) 40 MG DR capsule Take 1 capsule (40 mg) by mouth daily 90 capsule 0     vitamin D3 (CHOLECALCIFEROL) 2000 units tablet Take 1 tablet by mouth daily  3       Reviewed and updated as needed this visit by Provider         Review of Systems   ROS COMP: CONSTITUTIONAL: NEGATIVE for fever, chills, change in weight  EYES: NEGATIVE for vision changes or irritation  ENT/MOUTH: runny nose  RESP: NEGATIVE for significant cough , has some sob   CV: NEGATIVE for chest pain, palpitations or peripheral edema  MUSCULOSKELETAL: body aches   ENDOCRINE:h/o thyroid dz       Objective   Reported vitals:  There were no vitals taken for this visit.   healthy,  alert and no distress  PSYCH: Alert and oriented times 3; coherent speech, normal   rate and volume, able to articulate logical thoughts, able   to abstract reason, no tangential thoughts, no hallucinations   or delusions  Her affect is normal  RESP: No cough, no audible wheezing, able to talk in full sentences  Remainder of exam unable to be completed due to telephone visits            Assessment/Plan:    1. Essential hypertension  - hydrochlorothiazide has been refilled.explained clearly about the medication,insructions and side effects. Advised to follow low salt diet and exercise and f/u in 6 mths.       2. Hypothyroidism, unspecified type  - rpt TSH in 3 months, continue Levoxyl 50 mcg daily     3. Body aches  - feeling warm and chills, has not checked temp , patient was advised her to check her temperature at least twice daily , discussed with RN in clinic today Christelle and she will contact the pt and refer to on care site     4. Seasonal allergic rhinitis, unspecified trigger  - fluticasone (FLONASE) 50 MCG/ACT nasal spray; Spray 1 spray into both nostrils daily  Dispense: 9.9 mL; Refill: 0  - loratadine (CLARITIN) 10 MG tablet; Take 1 tablet (10 mg) by mouth daily  Dispense: 30 tablet; Refill: 1  Refilled.explained clearly about the medication,insructions and side effects.      3;26 pm - 3;38 pm     Phone call duration: 12 minutes    Tyree Metcalf MD

## 2020-04-13 NOTE — TELEPHONE ENCOUNTER
"Complains of shortness of breath and fever today during conversation with MD.  Runny nose, sneezing then started having shortness of breath and fever.  When asked to describe her symptoms (via ) she would only say \"I'm fine now\", symptoms have resolved with honey and natural treatments.  Encouraged to contact us or [email or web address] if symptoms return.  GO Brandt R.N.    "

## 2020-05-18 DIAGNOSIS — I10 ESSENTIAL HYPERTENSION: ICD-10-CM

## 2020-05-19 RX ORDER — HYDROCHLOROTHIAZIDE 25 MG/1
25 TABLET ORAL DAILY
Qty: 90 TABLET | Refills: 1 | OUTPATIENT
Start: 2020-05-19

## 2020-05-26 DIAGNOSIS — J30.2 SEASONAL ALLERGIC RHINITIS, UNSPECIFIED TRIGGER: ICD-10-CM

## 2020-05-28 RX ORDER — FLUTICASONE PROPIONATE 50 MCG
1 SPRAY, SUSPENSION (ML) NASAL DAILY
Qty: 16 G | Refills: 9 | Status: SHIPPED | OUTPATIENT
Start: 2020-05-28

## 2020-07-21 ENCOUNTER — OFFICE VISIT (OUTPATIENT)
Dept: INTERNAL MEDICINE | Facility: CLINIC | Age: 50
End: 2020-07-21
Payer: COMMERCIAL

## 2020-07-21 VITALS
SYSTOLIC BLOOD PRESSURE: 126 MMHG | HEART RATE: 82 BPM | OXYGEN SATURATION: 95 % | TEMPERATURE: 98.2 F | BODY MASS INDEX: 35.8 KG/M2 | RESPIRATION RATE: 17 BRPM | DIASTOLIC BLOOD PRESSURE: 80 MMHG | WEIGHT: 209.7 LBS | HEIGHT: 64 IN

## 2020-07-21 DIAGNOSIS — N89.8 VAGINAL ITCHING: ICD-10-CM

## 2020-07-21 DIAGNOSIS — Z00.00 ROUTINE HISTORY AND PHYSICAL EXAMINATION OF ADULT: Primary | ICD-10-CM

## 2020-07-21 DIAGNOSIS — J30.2 SEASONAL ALLERGIC RHINITIS, UNSPECIFIED TRIGGER: ICD-10-CM

## 2020-07-21 DIAGNOSIS — E03.9 HYPOTHYROIDISM, UNSPECIFIED TYPE: ICD-10-CM

## 2020-07-21 DIAGNOSIS — E66.01 MORBID OBESITY (H): ICD-10-CM

## 2020-07-21 DIAGNOSIS — I10 ESSENTIAL HYPERTENSION: ICD-10-CM

## 2020-07-21 LAB
HGB BLD-MCNC: 12.4 G/DL (ref 11.7–15.7)
SPECIMEN SOURCE: NORMAL
WET PREP SPEC: NORMAL

## 2020-07-21 PROCEDURE — 84443 ASSAY THYROID STIM HORMONE: CPT | Performed by: INTERNAL MEDICINE

## 2020-07-21 PROCEDURE — 99396 PREV VISIT EST AGE 40-64: CPT | Performed by: INTERNAL MEDICINE

## 2020-07-21 PROCEDURE — 99213 OFFICE O/P EST LOW 20 MIN: CPT | Mod: 25 | Performed by: INTERNAL MEDICINE

## 2020-07-21 PROCEDURE — 80053 COMPREHEN METABOLIC PANEL: CPT | Performed by: INTERNAL MEDICINE

## 2020-07-21 PROCEDURE — 87210 SMEAR WET MOUNT SALINE/INK: CPT | Performed by: INTERNAL MEDICINE

## 2020-07-21 PROCEDURE — 36415 COLL VENOUS BLD VENIPUNCTURE: CPT | Performed by: INTERNAL MEDICINE

## 2020-07-21 PROCEDURE — 85018 HEMOGLOBIN: CPT | Performed by: INTERNAL MEDICINE

## 2020-07-21 PROCEDURE — G0145 SCR C/V CYTO,THINLAYER,RESCR: HCPCS | Performed by: INTERNAL MEDICINE

## 2020-07-21 PROCEDURE — 80061 LIPID PANEL: CPT | Performed by: INTERNAL MEDICINE

## 2020-07-21 RX ORDER — LORATADINE 10 MG/1
10 TABLET ORAL DAILY
Qty: 30 TABLET | Refills: 1 | Status: SHIPPED | OUTPATIENT
Start: 2020-07-21 | End: 2020-12-08

## 2020-07-21 RX ORDER — HYDROCHLOROTHIAZIDE 25 MG/1
25 TABLET ORAL DAILY
Qty: 90 TABLET | Refills: 1 | Status: SHIPPED | OUTPATIENT
Start: 2020-07-21

## 2020-07-21 ASSESSMENT — ENCOUNTER SYMPTOMS
SHORTNESS OF BREATH: 0
MYALGIAS: 0
DIZZINESS: 0
NAUSEA: 0
CONSTIPATION: 0
HEMATOCHEZIA: 0
DYSURIA: 0
ABDOMINAL PAIN: 0
FEVER: 0
NERVOUS/ANXIOUS: 0
HEADACHES: 0
CHILLS: 0
JOINT SWELLING: 0
FREQUENCY: 0
DIARRHEA: 0
BREAST MASS: 0
PARESTHESIAS: 0
WEAKNESS: 0
ARTHRALGIAS: 0
SORE THROAT: 0
COUGH: 0
HEMATURIA: 0
EYE PAIN: 0
PALPITATIONS: 0
HEARTBURN: 0

## 2020-07-21 ASSESSMENT — MIFFLIN-ST. JEOR: SCORE: 1561.19

## 2020-07-21 NOTE — LETTER
July 29, 2020      Neema Kaur  [address]   [facility] [address]    Dear ,      I am happy to inform you that your recent cervical cancer screening test (PAP smear) was normal.      Preventative screenings such as this help to ensure your health for years to come. You should repeat a pap smear in 3 years, unless otherwise directed.      You will still need to return to the clinic every year for your annual exam and other preventive tests.     If you have additional questions regarding this result, please call our registered nurse, Karen at [phone].      Sincerely,      Tyree Metcalf MD//[facility]

## 2020-07-21 NOTE — PROGRESS NOTES
SUBJECTIVE:   CC: Neema Kaur is an 49 year old woman who presents for preventive health visit.     Healthy Habits:     Getting at least 3 servings of Calcium per day:  Yes    Bi-annual eye exam:  NO    Dental care twice a year:  NO    Sleep apnea or symptoms of sleep apnea:  None    Diet:  Regular (no restrictions)    Frequency of exercise:  2-3 days/week    Duration of exercise:  30-45 minutes    Taking medications regularly:  Yes    Medication side effects:  None    PHQ-2 Total Score: 0    Additional concerns today:  No       Hyperlipidemia Follow-Up      Are you regularly taking any medication or supplement to lower your cholesterol?   No    Are you having muscle aches or other side effects that you think could be caused by your cholesterol lowering medication?  NA    Hypothyroidism Follow-up      Since last visit, patient describes the following symptoms: Weight stable, no hair loss, no skin changes, no constipation, no loose stools    Pt c/o vaginal itching since few wks, no vag disparage.       Today's PHQ-2 Score:   PHQ-2 ( 1999 [employer]) 7/21/2020   Q1: Little interest or pleasure in doing things 0   Q2: Feeling down, depressed or hopeless 0   PHQ-2 Score 0   Q1: Little interest or pleasure in doing things Not at all   Q2: Feeling down, depressed or hopeless Not at all   PHQ-2 Score 0       Abuse: Current or Past(Physical, Sexual or Emotional)- No  Do you feel safe in your environment? Yes      Past Medical History:   Diagnosis Date     Arthritis      Hypertension      Thyroid disease        History reviewed. No pertinent surgical history.    Current Outpatient Medications   Medication Sig Dispense Refill     fluticasone (FLONASE) 50 MCG/ACT nasal spray SPRAY 1 SPRAY INTO BOTH NOSTRILS DAILY 16 g 9     hydrochlorothiazide (HYDRODIURIL) 25 MG tablet Take 1 tablet (25 mg) by mouth daily 90 tablet 1     levothyroxine (SYNTHROID) 50 MCG tablet Take 1 tablet (50 mcg) by mouth daily 90 tablet 0      loratadine (CLARITIN) 10 MG tablet Take 1 tablet (10 mg) by mouth daily 30 tablet 1     omeprazole (PRILOSEC) 40 MG DR capsule Take 1 capsule (40 mg) by mouth daily 90 capsule 0     vitamin D3 (CHOLECALCIFEROL) 2000 units tablet Take 1 tablet by mouth daily  3       History reviewed. No pertinent family history.    Social History     Tobacco Use     Smoking status: Never Smoker     Smokeless tobacco: Never Used   Substance Use Topics     Alcohol use: Never     Frequency: Never     If you drink alcohol do you typically have >3 drinks per day or >7 drinks per week? No    Alcohol Use 7/21/2020   Prescreen: >3 drinks/day or >7 drinks/week? No   Prescreen: >3 drinks/day or >7 drinks/week? -   No flowsheet data found.    Reviewed orders with patient.  Reviewed health maintenance and updated orders accordingly - Yes       Pertinent mammograms are reviewed under the imaging tab.  History of abnormal Pap smear: NO - age 30-65 PAP every 5 years with negative HPV co-testing recommended     Reviewed and updated as needed this visit by clinical staff  Tobacco  Allergies  Meds  Med Hx  Surg Hx  Fam Hx  Soc Hx        Reviewed and updated as needed this visit by Provider            Review of Systems   Constitutional: Negative for chills and fever.   HENT: Negative for congestion, ear pain, hearing loss and sore throat.    Eyes: Negative for pain and visual disturbance.   Respiratory: Negative for cough and shortness of breath.    Cardiovascular: Negative for chest pain, palpitations and peripheral edema.   Gastrointestinal: Negative for abdominal pain, constipation, diarrhea, heartburn, hematochezia and nausea.   Breasts:  Negative for tenderness, breast mass and discharge.   Genitourinary: Negative for dysuria, frequency, genital sores, hematuria, pelvic pain, urgency, vaginal bleeding and vaginal discharge.   Musculoskeletal: Negative for arthralgias, joint swelling and myalgias.   Skin: Negative for rash.   Neurological:  Negative for dizziness, weakness, headaches and paresthesias.   Psychiatric/Behavioral: Negative for mood changes. The patient is not nervous/anxious.            OBJECTIVE:   /80   Pulse 82   Temp 98.2  F (36.8  C) (Oral)   Resp 17   Wt 95.1 kg (209 lb 11.2 oz)   SpO2 95%   BMI 35.99 kg/m    Physical Exam  GENERAL: healthy, alert and no distress  EYES: Eyes grossly normal to inspection, PERRL and conjunctivae and sclerae normal  HENT: ear canals and TM's normal, nose and mouth without ulcers or lesions  NECK: no adenopathy, no asymmetry, masses, or scars and thyroid normal to palpation  RESP: lungs clear to auscultation - no rales, rhonchi or wheezes  BREAST: normal without masses, tenderness or nipple discharge and no palpable axillary masses or adenopathy  CV: regular rate and rhythm, normal S1 S2, no S3 or S4, no murmur, click or rub, no peripheral edema and peripheral pulses strong  ABDOMEN: soft, nontender, no hepatosplenomegaly, no masses and bowel sounds normal   (female): normal female external genitalia, normal urethral meatus, vaginal mucosa pink, moist, well rugated, and normal cervix/adnexa  without masses , pap obtained, wet prep done.   MS: no gross musculoskeletal defects noted, no edema  NEURO: Normal strength and tone, mentation intact and speech normal  PSYCH: mentation appears normal, affect normal/bright      ASSESSMENT/PLAN:      (Z00.00) Routine history and physical examination of adult  (primary encounter diagnosis)  Plan: Hemoglobin, Comprehensive metabolic panel,         Lipid panel reflex to direct LDL Fasting, pap obtained, mammogram ordered.             (E66.01) Morbid obesity (H)  Plan:  Discussed in detail about Diet,calorie intake,and importance of regular exercise,       (I10) Essential hypertension  Comment: BP stable  Plan: hydrochlorothiazide (HYDRODIURIL) 25 MG tablet refilled.explained clearly about the medication,insructions and side effects.           (E03.9)  "Hypothyroidism, unspecified type  Plan: on levoxyl  50 mcg daily, check TSH with free T4 reflex and adjust Levoxyl dose  after results            (J30.2) Seasonal allergic rhinitis, unspecified trigger  Plan: loratadine (CLARITIN) 10 MG tablet refilled as directed.explained clearly about the medication,insructions and side effects.            (N89.8) Vaginal itching  Plan: Wet prep.pt was told I will contact her after results and proceed accordingly.      COUNSELING:  Reviewed preventive health counseling, as reflected in patient instructions       Regular exercise       Healthy diet/nutrition       Immunizations    Recommended Tdap vaccine, patient is not sure when her last vaccination was she will check and let us know      Estimated body mass index is 35.99 kg/m  as calculated from the following:    Height as of 6/25/19: 1.626 m (5' 4\").    Weight as of this encounter: 95.1 kg (209 lb 11.2 oz).    Weight management plan: Discussed healthy diet and exercise guidelines     reports that she has never smoked. She has never used smokeless tobacco.      Counseling Resources:  ATP IV Guidelines  Pooled Cohorts Equation Calculator  Breast Cancer Risk Calculator  FRAX Risk Assessment  ICSI Preventive Guidelines  Dietary Guidelines for Americans, 2010  USDA's MyPlate  ASA Prophylaxis  Lung CA Screening    Tyree Metcalf MD  [facility]  "

## 2020-07-22 LAB
ALBUMIN SERPL-MCNC: 3.7 G/DL (ref 3.4–5)
ALP SERPL-CCNC: 53 U/L (ref 40–150)
ALT SERPL W P-5'-P-CCNC: 22 U/L (ref 0–50)
ANION GAP SERPL CALCULATED.3IONS-SCNC: 8 MMOL/L (ref 3–14)
AST SERPL W P-5'-P-CCNC: 18 U/L (ref 0–45)
BILIRUB SERPL-MCNC: 0.5 MG/DL (ref 0.2–1.3)
BUN SERPL-MCNC: 9 MG/DL (ref 7–30)
CALCIUM SERPL-MCNC: 9 MG/DL (ref 8.5–10.1)
CHLORIDE SERPL-SCNC: 104 MMOL/L (ref 94–109)
CHOLEST SERPL-MCNC: 209 MG/DL
CO2 SERPL-SCNC: 26 MMOL/L (ref 20–32)
CREAT SERPL-MCNC: 0.49 MG/DL (ref 0.52–1.04)
GFR SERPL CREATININE-BSD FRML MDRD: >90 ML/MIN/{1.73_M2}
GLUCOSE SERPL-MCNC: 97 MG/DL (ref 70–99)
HDLC SERPL-MCNC: 51 MG/DL
LDLC SERPL CALC-MCNC: 130 MG/DL
NONHDLC SERPL-MCNC: 158 MG/DL
POTASSIUM SERPL-SCNC: 3.5 MMOL/L (ref 3.4–5.3)
PROT SERPL-MCNC: 8.1 G/DL (ref 6.8–8.8)
SODIUM SERPL-SCNC: 138 MMOL/L (ref 133–144)
TRIGL SERPL-MCNC: 141 MG/DL
TSH SERPL DL<=0.005 MIU/L-ACNC: 0.69 MU/L (ref 0.4–4)

## 2020-07-24 LAB
COPATH REPORT: NORMAL
PAP: NORMAL

## 2020-09-16 ENCOUNTER — TRANSFERRED RECORDS (OUTPATIENT)
Dept: HEALTH INFORMATION MANAGEMENT | Facility: CLINIC | Age: 50
End: 2020-09-16

## 2020-12-07 DIAGNOSIS — J30.2 SEASONAL ALLERGIC RHINITIS, UNSPECIFIED TRIGGER: ICD-10-CM

## 2020-12-08 RX ORDER — LORATADINE 10 MG/1
10 TABLET ORAL DAILY
Qty: 30 TABLET | Refills: 7 | Status: SHIPPED | OUTPATIENT
Start: 2020-12-08

## 2020-12-08 NOTE — TELEPHONE ENCOUNTER
Pending Prescriptions:                       Disp   Refills    loratadine (CLARITIN) 10 MG tablet [Pharm*30 tab*1            Sig: TAKE 1 TABLET (10 MG) BY MOUTH DAILY      Prescription approved per [facility] Refill Protocol.

## 2024-03-05 ENCOUNTER — LAB REQUISITION (OUTPATIENT)
Dept: LAB | Facility: CLINIC | Age: 54
End: 2024-03-05

## 2024-03-05 DIAGNOSIS — I10 ESSENTIAL (PRIMARY) HYPERTENSION: ICD-10-CM

## 2024-03-06 LAB
ANION GAP SERPL CALCULATED.3IONS-SCNC: 7 MMOL/L (ref 7–15)
BUN SERPL-MCNC: 13.6 MG/DL (ref 6–20)
CALCIUM SERPL-MCNC: 8.9 MG/DL (ref 8.6–10)
CHLORIDE SERPL-SCNC: 102 MMOL/L (ref 98–107)
CREAT SERPL-MCNC: 0.47 MG/DL (ref 0.51–0.95)
DEPRECATED HCO3 PLAS-SCNC: 31 MMOL/L (ref 22–29)
EGFRCR SERPLBLD CKD-EPI 2021: >90 ML/MIN/1.73M2
ERYTHROCYTE [DISTWIDTH] IN BLOOD BY AUTOMATED COUNT: 14.2 % (ref 10–15)
GLUCOSE SERPL-MCNC: 105 MG/DL (ref 70–99)
HCT VFR BLD AUTO: 31 % (ref 35–47)
HGB BLD-MCNC: 10.1 G/DL (ref 11.7–15.7)
MCH RBC QN AUTO: 29.1 PG (ref 26.5–33)
MCHC RBC AUTO-ENTMCNC: 32.6 G/DL (ref 31.5–36.5)
MCV RBC AUTO: 89 FL (ref 78–100)
PLATELET # BLD AUTO: 279 10E3/UL (ref 150–450)
POTASSIUM SERPL-SCNC: 3.8 MMOL/L (ref 3.4–5.3)
RBC # BLD AUTO: 3.47 10E6/UL (ref 3.8–5.2)
SODIUM SERPL-SCNC: 140 MMOL/L (ref 135–145)
WBC # BLD AUTO: 6 10E3/UL (ref 4–11)

## 2024-03-06 PROCEDURE — 80048 BASIC METABOLIC PNL TOTAL CA: CPT | Mod: ORL

## 2024-03-06 PROCEDURE — P9604 ONE-WAY ALLOW PRORATED TRIP: HCPCS | Mod: ORL

## 2024-03-06 PROCEDURE — 36415 COLL VENOUS BLD VENIPUNCTURE: CPT | Mod: ORL

## 2024-03-06 PROCEDURE — 85027 COMPLETE CBC AUTOMATED: CPT | Mod: ORL
